# Patient Record
Sex: FEMALE | Race: WHITE | NOT HISPANIC OR LATINO | Employment: FULL TIME | URBAN - NONMETROPOLITAN AREA
[De-identification: names, ages, dates, MRNs, and addresses within clinical notes are randomized per-mention and may not be internally consistent; named-entity substitution may affect disease eponyms.]

---

## 2017-05-23 ENCOUNTER — TRANSCRIBE ORDERS (OUTPATIENT)
Dept: CARDIOLOGY | Facility: CLINIC | Age: 62
End: 2017-05-23

## 2017-05-23 DIAGNOSIS — I10 ESSENTIAL HYPERTENSION: Primary | ICD-10-CM

## 2017-06-01 ENCOUNTER — CONSULT (OUTPATIENT)
Dept: CARDIOLOGY | Facility: CLINIC | Age: 62
End: 2017-06-01

## 2017-06-01 VITALS
DIASTOLIC BLOOD PRESSURE: 70 MMHG | SYSTOLIC BLOOD PRESSURE: 128 MMHG | HEART RATE: 84 BPM | BODY MASS INDEX: 31.07 KG/M2 | WEIGHT: 182 LBS | HEIGHT: 64 IN

## 2017-06-01 DIAGNOSIS — E78.00 HYPERCHOLESTEREMIA: ICD-10-CM

## 2017-06-01 DIAGNOSIS — R42 VERTIGO: ICD-10-CM

## 2017-06-01 DIAGNOSIS — G45.8 OTHER SPECIFIED TRANSIENT CEREBRAL ISCHEMIAS: ICD-10-CM

## 2017-06-01 DIAGNOSIS — R00.0 TACHYCARDIA: ICD-10-CM

## 2017-06-01 DIAGNOSIS — E88.81 METABOLIC SYNDROME: ICD-10-CM

## 2017-06-01 DIAGNOSIS — I10 ESSENTIAL HYPERTENSION: Primary | ICD-10-CM

## 2017-06-01 PROCEDURE — 99244 OFF/OP CNSLTJ NEW/EST MOD 40: CPT | Performed by: INTERNAL MEDICINE

## 2017-06-01 PROCEDURE — 93000 ELECTROCARDIOGRAM COMPLETE: CPT | Performed by: INTERNAL MEDICINE

## 2017-06-01 RX ORDER — METOPROLOL TARTRATE 50 MG/1
50 TABLET, FILM COATED ORAL 2 TIMES DAILY
COMMUNITY
End: 2017-06-01

## 2017-06-01 RX ORDER — UBIDECARENONE 200 MG
200 CAPSULE ORAL DAILY
COMMUNITY

## 2017-06-01 RX ORDER — CLONIDINE HYDROCHLORIDE 0.1 MG/1
TABLET ORAL
COMMUNITY

## 2017-06-01 RX ORDER — PRAVASTATIN SODIUM 20 MG
20 TABLET ORAL DAILY
COMMUNITY
End: 2017-06-01

## 2017-06-01 RX ORDER — HYDROXYZINE HYDROCHLORIDE 25 MG/1
25 TABLET, FILM COATED ORAL NIGHTLY
COMMUNITY

## 2017-06-01 RX ORDER — PRAVASTATIN SODIUM 40 MG
40 TABLET ORAL DAILY
Qty: 30 TABLET | Refills: 8 | Status: SHIPPED | OUTPATIENT
Start: 2017-06-01

## 2017-06-01 RX ORDER — MECLIZINE HYDROCHLORIDE 25 MG/1
25 TABLET ORAL 3 TIMES DAILY PRN
COMMUNITY

## 2017-06-01 RX ORDER — METOPROLOL TARTRATE 50 MG/1
50 TABLET, FILM COATED ORAL 2 TIMES DAILY
COMMUNITY
Start: 2017-06-01

## 2017-06-01 RX ORDER — LISINOPRIL 20 MG/1
20 TABLET ORAL DAILY
COMMUNITY

## 2017-06-01 NOTE — PROGRESS NOTES
CARDIAC COMPLAINTS  Dizziness, palpitations and Vertigo      Subjective   Arminda Renteria is a 61 y.o. female came in today for her initial cardiac evaluation.  She has history of hypertension and hypercholesterolemia.  About a month ago, she had an episode where she got up in the morning and was having some dizziness and mild vertigo.  Her blood pressure was mildly elevated at that time.  She took current medications and went to work.  There is the symptoms got little bit worse and she came home.  Her blood pressure went as high as 230/101.  She had significant vertigo and also vomiting.  She did not seek any medical attention.  She tried to sleep and next day she felt better.  When she came to your office her blood pressure was 150/80 with a heart rate of 78.  Metoprolol 50 may gram-positive was prescribed, but so far she has not taken it.  Apparently most of the time the blood pressure is stable and the heart rate is around 80's.  She denies having any chest pain during this time, denies having any migraine headache during this time.  She apparently had a carotid ultrasound and plan to have an MRA soon    No past surgical history on file.    Current Outpatient Prescriptions   Medication Sig Dispense Refill   • CloNIDine (CATAPRES) 0.1 MG tablet One tablet every 6 hours as needed for systolic pressure [id number].     • Coenzyme Q10 200 MG capsule Take 200 mg by mouth Daily.     • hydrOXYzine (ATARAX) 25 MG tablet Take 25 mg by mouth Every Night.     • lisinopril (PRINIVIL,ZESTRIL) 20 MG tablet Take 20 mg by mouth Daily.     • meclizine (ANTIVERT) 25 MG tablet Take 25 mg by mouth 3 (Three) Times a Day As Needed for dizziness.     • metoprolol tartrate (LOPRESSOR) 50 MG tablet Take 1 tablet by mouth 2 (Two) Times a Day. Reduce it to 1/2 tab BID     • pravastatin (PRAVACHOL) 40 MG tablet Take 1 tablet by mouth Daily. 30 tablet 8     No current facility-administered medications for this visit.         "    ALLERGIES:  Latex    Past Medical History:   Diagnosis Date   • Anemia    • Depression    • GERD (gastroesophageal reflux disease)    • H/O plastic surgery 1975    M/A   • Hx of eye surgery    • Hx of tubal ligation    • Hyperlipidemia    • Hypertension    • Insomnia    • Vitamin D deficiency        History   Smoking Status   • Never Smoker   Smokeless Tobacco   • Never Used        Review of Systems   Constitution: Negative for decreased appetite and weakness.   HENT: Negative for congestion and hoarse voice.    Eyes: Positive for blurred vision and double vision.   Cardiovascular: Positive for palpitations. Negative for chest pain.   Respiratory: Negative for cough and snoring.    Endocrine: Negative for cold intolerance.   Hematologic/Lymphatic: Negative for adenopathy.   Skin: Negative for nail changes.   Musculoskeletal: Positive for arthritis.   Gastrointestinal: Positive for vomiting. Negative for bloating and diarrhea.   Genitourinary: Negative for bladder incontinence and hematuria.   Neurological: Positive for vertigo.   Psychiatric/Behavioral: Negative for altered mental status.       Diabetes- No  Thyroid- normal    Objective     /70 (BP Location: Right arm)  Pulse 84  Ht 64\" (162.6 cm)  Wt 182 lb (82.6 kg)  BMI 31.24 kg/m2    Physical Exam   Constitutional: She appears well-developed.   HENT:   Head: Normocephalic.   Eyes: Pupils are equal, round, and reactive to light.   Neck: Normal range of motion.   Cardiovascular: Normal rate.    Murmur heard.  Pulmonary/Chest: Effort normal and breath sounds normal.   Abdominal: Soft. Bowel sounds are normal.   Neurological: She is alert.   Skin: Skin is warm.   Psychiatric: She has a normal mood and affect.         ECG 12 Lead  Date/Time: 6/1/2017 3:00 PM  Performed by: MOISES SKY  Authorized by: MOISES SKY   Previous ECG: no previous ECG available  Rhythm: sinus rhythm  Rate: normal  QRS axis: normal  Clinical impression: " non-specific ECG              Assessment/Plan    At baseline her heart rate and blood pressure appears stable .  Her EKG showed sinus rhythm with nonspecific ST-T changes .  I'm not sure whether she had an hypertensive urgency on that day or whether she had atypical migraine with secondary hypertension.  I agree with you about doing the MRI to rule out any Lachman infarct .  At this time , I advised her to continue the metoprolol but restarted to lower dose 25 mg twice a day .  I also increased her dose of Pravachol to 40 mg once a day .  I scheduled her for an echocardiogram with bubble study to rule out any shunt .  Since she had one episode similar to that almost 2 years ago , he can hold off at this time any investigation for secondary hypertension .  I did explain to her , that if the symptoms recurs she needs to undergo 24 hours urine and also a CT of the abdomen to rule out pheochromocytoma or other adrenal hypersecretion .  Arminda was seen today for establish care and dizziness.    Diagnoses and all orders for this visit:    Essential hypertension    Hypercholesteremia  -     pravastatin (PRAVACHOL) 40 MG tablet; Take 1 tablet by mouth Daily.    Other specified transient cerebral ischemias  -     Adult Transthoracic Echo Complete With Contrast; Future    Tachycardia  -     Adult Transthoracic Echo Complete With Contrast; Future    Metabolic syndrome    Vertigo  -     Adult Transthoracic Echo Complete With Contrast; Future                    Electronically signed by Tiera Storey MD June 1, 2017 2:51 PM

## 2017-06-12 ENCOUNTER — HOSPITAL ENCOUNTER (OUTPATIENT)
Dept: CARDIOLOGY | Facility: HOSPITAL | Age: 62
Discharge: HOME | End: 2017-06-12
Attending: INTERNAL MEDICINE | Admitting: INTERNAL MEDICINE

## 2017-06-12 ENCOUNTER — OUTSIDE FACILITY SERVICE (OUTPATIENT)
Dept: CARDIOLOGY | Facility: CLINIC | Age: 62
End: 2017-06-12

## 2017-06-12 DIAGNOSIS — R42 VERTIGO: ICD-10-CM

## 2017-06-12 DIAGNOSIS — G45.8 OTHER SPECIFIED TRANSIENT CEREBRAL ISCHEMIAS: ICD-10-CM

## 2017-06-12 DIAGNOSIS — R00.0 TACHYCARDIA: ICD-10-CM

## 2017-06-12 PROCEDURE — C8929 TTE W OR WO FOL WCON,DOPPLER: HCPCS

## 2017-06-12 PROCEDURE — 93306 TTE W/DOPPLER COMPLETE: CPT | Performed by: INTERNAL MEDICINE

## 2017-06-12 RX ORDER — SODIUM CHLORIDE 9 MG/ML
8 INJECTION INTRAMUSCULAR; INTRAVENOUS; SUBCUTANEOUS AS NEEDED
Status: DISCONTINUED | OUTPATIENT
Start: 2017-06-12 | End: 2017-06-13 | Stop reason: HOSPADM

## 2017-06-12 RX ADMIN — SODIUM CHLORIDE 8 ML: 9 INJECTION, SOLUTION INTRAMUSCULAR; INTRAVENOUS; SUBCUTANEOUS at 09:41
